# Patient Record
Sex: FEMALE | Race: WHITE | NOT HISPANIC OR LATINO | URBAN - METROPOLITAN AREA
[De-identification: names, ages, dates, MRNs, and addresses within clinical notes are randomized per-mention and may not be internally consistent; named-entity substitution may affect disease eponyms.]

---

## 2018-11-15 ENCOUNTER — APPOINTMENT (RX ONLY)
Dept: URBAN - METROPOLITAN AREA CLINIC 304 | Facility: CLINIC | Age: 28
Setting detail: DERMATOLOGY
End: 2018-11-15

## 2018-11-15 DIAGNOSIS — L72.8 OTHER FOLLICULAR CYSTS OF THE SKIN AND SUBCUTANEOUS TISSUE: ICD-10-CM

## 2018-11-15 PROBLEM — L23.7 ALLERGIC CONTACT DERMATITIS DUE TO PLANTS, EXCEPT FOOD: Status: ACTIVE | Noted: 2018-11-15

## 2018-11-15 PROCEDURE — 10060 I&D ABSCESS SIMPLE/SINGLE: CPT

## 2018-11-15 PROCEDURE — ? INCISION AND DRAINAGE

## 2018-11-15 ASSESSMENT — LOCATION DETAILED DESCRIPTION DERM: LOCATION DETAILED: RIGHT BUTTOCK

## 2018-11-15 ASSESSMENT — LOCATION ZONE DERM: LOCATION ZONE: TRUNK

## 2018-11-15 ASSESSMENT — LOCATION SIMPLE DESCRIPTION DERM: LOCATION SIMPLE: RIGHT BUTTOCK

## 2023-08-24 NOTE — PROCEDURE: INCISION AND DRAINAGE
Drainage Amount?: moderate
Patient saw Hematology yesterday, Dr. Shahid, did not know why she was there to see him yesterday.     Was told to f/u with Cardiology.     Patient has some questions ask to who she needs to see and what she needs to do as far as her health.     Patient is asking for a call back.   
Sent in jardiance and atorvastatin per patients request. Patient states she would run out before mail order could get there. Sent a short script to local pharmacy and mail order pharmacy.   
Curette: No
Epidermal Sutures: 4-0 Ethilon
Lesion Type: Cyst
Anesthesia Volume In Cc: 0.3
Preparation Text: The area was prepped in the usual clean fashion.
Epidermal Closure: simple interrupted
Size Of Lesion In Cm (Optional But May Be Required For Some Insurances): 2
Dressing: dry sterile dressing
Suture Text: The incision was partially closed with
Curette Text (Optional): After the contents were expressed a curette was used to partially remove the cyst wall.
Detail Level: Detailed
Anesthesia Type: 1% lidocaine with epinephrine
Wound Care: Aquaphor
Method: 11 blade